# Patient Record
Sex: MALE | Race: BLACK OR AFRICAN AMERICAN | Employment: OTHER | URBAN - METROPOLITAN AREA
[De-identification: names, ages, dates, MRNs, and addresses within clinical notes are randomized per-mention and may not be internally consistent; named-entity substitution may affect disease eponyms.]

---

## 2019-03-13 ENCOUNTER — OFFICE VISIT (OUTPATIENT)
Dept: FAMILY MEDICINE | Facility: CLINIC | Age: 44
End: 2019-03-13

## 2019-03-13 ENCOUNTER — ANCILLARY PROCEDURE (OUTPATIENT)
Dept: GENERAL RADIOLOGY | Facility: CLINIC | Age: 44
End: 2019-03-13
Attending: FAMILY MEDICINE

## 2019-03-13 VITALS
DIASTOLIC BLOOD PRESSURE: 91 MMHG | HEART RATE: 78 BPM | HEIGHT: 67 IN | RESPIRATION RATE: 18 BRPM | OXYGEN SATURATION: 100 % | SYSTOLIC BLOOD PRESSURE: 138 MMHG | WEIGHT: 159.6 LBS | BODY MASS INDEX: 25.05 KG/M2 | TEMPERATURE: 98.1 F

## 2019-03-13 DIAGNOSIS — F10.11 HISTORY OF ALCOHOL ABUSE: ICD-10-CM

## 2019-03-13 DIAGNOSIS — R07.89 MID STERNAL CHEST PAIN: Primary | ICD-10-CM

## 2019-03-13 SDOH — HEALTH STABILITY: MENTAL HEALTH: HOW OFTEN DO YOU HAVE A DRINK CONTAINING ALCOHOL?: NEVER

## 2019-03-13 ASSESSMENT — MIFFLIN-ST. JEOR: SCORE: 1587.57

## 2019-03-13 NOTE — PROGRESS NOTES
Male Physical Note          HPI         Concerns today: Mid sternal pain.  Has been a problem for 20 years.  Patient believes it is due to a history of heavy drinking.  Is located in the middle of the chest, described as a sore feeling.  No left-sided chest pain or trouble breathing.  Denies a history of trauma.  He does not take any medications to help with pain.  He says the pain is worse in the cold or after working long hours. There is no known family history of heart disease.  Denies smoking.  Works as a  and at [employer].  His main reason for today's visit is to get a chest x-ray to make sure his lungs and bones are okay.    Patient Active Problem List   Diagnosis     Mid sternal chest pain     History of alcohol abuse       Past Medical History:   Diagnosis Date     Alcoholism (H)        Previous Medical Care   Says he has never seen a doctor before     Family History   Problem Relation Age of Onset     Cancer No family hx of      Asthma No family hx of      Arthritis No family hx of      Depression No family hx of             Review of Systems:     Review of Systems:  CONSTITUTIONAL: NEGATIVE for fever, chills, change in weight  INTEGUMENTARY/SKIN: NEGATIVE for worrisome rashes, moles or lesions  EYES: NEGATIVE for vision changes or irritation  ENT/MOUTH: NEGATIVE for ear, mouth and throat problems  RESP: NEGATIVE for significant cough or SOB  BREAST: NEGATIVE for masses, tenderness or discharge  CV: NEGATIVE for chest pain, palpitations or peripheral edema  GI: NEGATIVE for nausea, abdominal pain, heartburn, or change in bowel habits  : NEGATIVE for frequency, dysuria, or hematuria  MUSCULOSKELETAL: NEGATIVE for significant arthralgias or myalgia  NEURO: NEGATIVE for weakness, dizziness or paresthesias  ENDOCRINE: NEGATIVE for temperature intolerance, skin/hair changes  HEME/ALLERGY: NEGATIVE for bleeding problems  PSYCHIATRIC: NEGATIVE for changes in mood or affect  Sleep:    Do you snore most or the night (as reported by a family member)? No  Do you feel sleepy or extremely tired during most of the day? No           Social History     Social History     Socioeconomic History     Marital status:      Spouse name: Not on file     Number of children: Not on file     Years of education: Not on file     Highest education level: Not on file   Occupational History     Not on file   Social Needs     Financial resource strain: Not on file     Food insecurity:     Worry: Not on file     Inability: Not on file     Transportation needs:     Medical: Not on file     Non-medical: Not on file   Tobacco Use     Smoking status: Never Smoker     Smokeless tobacco: Never Used   Substance and Sexual Activity     Alcohol use: No     Frequency: Never     Drug use: No     Sexual activity: Not Currently   Lifestyle     Physical activity:     Days per week: Not on file     Minutes per session: Not on file     Stress: Not on file   Relationships     Social connections:     Talks on phone: Not on file     Gets together: Not on file     Attends Ten[employer] service: Not on file     Active member of club or organization: Not on file     Attends meetings of clubs or organizations: Not on file     Relationship status: Not on file     Intimate partner violence:     Fear of current or ex partner: Not on file     Emotionally abused: Not on file     Physically abused: Not on file     Forced sexual activity: Not on file   Other Topics Concern     Not on file   Social History Narrative     Not on file       Marital Status:    Has anyone hurt you physically, for example by pushing, hitting, slapping or kicking you or forcing you to have sex? Denies  Do you feel threatened or controlled by a partner, ex-partner or anyone in your life? Denies    Sexual Health     Did not discuss sexual health      Recommended Screening     HIV screening:  Recommended and patient declined testing.           Physical Exam:  "    Vitals: BP (!) 138/91   Pulse 78   Temp 98.1  F (36.7  C)   Resp 18   Ht 1.702 m (5' 7\")   Wt 72.4 kg (159 lb 9.6 oz)   SpO2 100%   BMI 25.00 kg/m    BMI= Body mass index is 25 kg/m .     GENERAL: healthy, alert and no distress  EYES: Eyes grossly normal to inspection  HENT: NC/AT  RESP: lungs clear to auscultation - no rales, no rhonchi, no wheezes  CV: regular rates and rhythm, normal S1 S2, no S3 or S4 and no murmur, no click or rub -  ABDOMEN: soft, no tenderness  NEURO: mentation- intact, speech- normal  PSYCH: Alert; speech- coherent , normal rate and volume; able to articulate logical thoughts, able to abstract reason, no tangential thoughts, no hallucinations or delusions, affect- normal    Assessment and Plan     Patient presented today to establish care and physical with a concern of midsternal chest pain that has been ongoing for 20 years.  He tells me he has not seen a doctor in the past.  Would not want any blood test done today because he does not want to know if there are any abnormal results as this would cause him stress.  The only thing he wants at today's visit is an x-ray of his chest.  He is not very interested in discussing health maintenance and preventative healthcare recommendations today.  It was difficult to explore his history completely today.     1. Mid sternal chest pain  A chest x-ray was obtained and on my initial evaluation of the images, did not see any significant pathology to explain patient's symptoms.  Will await read from radiologist.  And patient described his history of alcohol abuse, there was concern for reflux versus esophagitis.  This remains on the differential.  Low concern for cardiac etiology.  We will need to reevaluate at follow-up visit.  - XR CHEST 2 VW    2. History of alcohol abuse  Patient reports that many years ago he \"drank a lot\" but will not quantify the use of alcohol for me.  Says he does not drink more than 1 glass of wine once in a while " now.  History of alcohol abuse may be contributing to his presenting midsternal chest pain.  We will continue to evaluate.      Follow-up within 2-4 weeks.    Options for treatment and follow-up care were reviewed with the patient. Eliceo Lakew and/or guardian engaged in the decision making process and verbalized understanding of the options discussed and agreed with the final plan.    Veronica Maldonado MD  Family Medicine PGY3 Resident

## 2019-03-13 NOTE — PROGRESS NOTES
Preceptor Attestation:   Patient seen, evaluated and discussed with the resident. I have verified the content of the note, which accurately reflects my assessment of the patient and the plan of care.   Supervising Physician:  Rosales Morales MD

## 2019-08-26 ENCOUNTER — OFFICE VISIT (OUTPATIENT)
Dept: FAMILY MEDICINE | Facility: CLINIC | Age: 44
End: 2019-08-26
Payer: MEDICAID

## 2019-08-26 VITALS
DIASTOLIC BLOOD PRESSURE: 55 MMHG | BODY MASS INDEX: 25.39 KG/M2 | WEIGHT: 158 LBS | OXYGEN SATURATION: 99 % | HEART RATE: 74 BPM | HEIGHT: 66 IN | SYSTOLIC BLOOD PRESSURE: 124 MMHG | TEMPERATURE: 97.3 F

## 2019-08-26 DIAGNOSIS — M54.6 CHRONIC BILATERAL THORACIC BACK PAIN: Primary | ICD-10-CM

## 2019-08-26 DIAGNOSIS — G89.29 CHRONIC BILATERAL THORACIC BACK PAIN: Primary | ICD-10-CM

## 2019-08-26 DIAGNOSIS — M41.9 SCOLIOSIS, UNSPECIFIED SCOLIOSIS TYPE, UNSPECIFIED SPINAL REGION: ICD-10-CM

## 2019-08-26 ASSESSMENT — MIFFLIN-ST. JEOR: SCORE: 1566.68

## 2019-08-26 NOTE — PATIENT INSTRUCTIONS
Here is the plan from today's visit    1. Chronic back pain  Heat and ibuprofen   Physical therapy  If this does not help with pain will get XR in 1 month  - [facility], PT, HAND AND CHIROPRACTIC REFERRAL - [facility]; Future      Please call or return to clinic if your symptoms don't go away.    Follow up plan  1 month    Thank you for coming to [facility] today.  Lab Testing:  **If you had lab testing today and your results are reassuring or normal they will be mailed to you or sent through [employer] within 7 days.   **If the lab tests need quick action we will call you with the results.  The phone number we will call with results is # [phone] (home) . If this is not the best number please call our clinic and change the number.  Medication Refills:  If you need any refills please call your pharmacy and they will contact us.   If you need to  your refill at a new pharmacy, please contact the new pharmacy directly. The new pharmacy will help you get your medications transferred faster.   Scheduling:  If you have any concerns about today's visit or wish to schedule another appointment please call our office during normal business hours [phone] (8-5:00 M-F)  If a referral was made to a [facility] and you don't get a call from central scheduling please call [phone].  If a Mammogram was ordered for you at [facility] call [phone] to schedule or change your appointment.  If you had an XRay/CT/Ultrasound/MRI ordered the number is [phone] to schedule or change your radiology appointment.   Medical Concerns:  If you have urgent medical concerns please call [phone] at any time of the day.    Laura Frazier MD

## 2019-08-26 NOTE — PROGRESS NOTES
"       HPI       Eliceo Bird is a 43 year old  who presents for   Chief Complaint   Patient presents with     Pain     mid to lower back pain, ongoing for years, pain level 6       Back Pain      Duration: 15 years, sudden onset when fell playing sports. Has hurt ever since, has not seen a doctor for this. No recent worsening    Description:   Location of pain: low back bilateral, middle of back bilateral and upper back bilateral  Character of pain: dull ache  Pain radiation:none leans to the side  New numbness or weakness in legs, not attributed to pain:  No   Any new bowel or bladder incontinence?No     Intensity: Currently 6/10. Always has pain, but not severe. Nothing makes worse or better    History:   Pain interferes with job/home/school: No   History of back problems: no prior back problems  Any previous MRI or X-rays: None  Sees a specialist for back pain:  No  Therapies tried without relief: none    Alleviating factors:   Improved by: none tried      Precipitating factors:  Worsened by: Nothing      Accompanying Signs & Symptoms:  Risk of Fracture: No   Risk of Cauda Equina:No   Risk of Infection:  No   Risk of Cancer:  No     +++++++      Problem, Medication and Allergy Lists were reviewed and updated if needed..    Patient is an established patient of this clinic..         Review of Systems:   Review of Systems  6 system ROS negative other than as noted in HPI       Physical Exam:     Vitals:    08/26/19 1321   BP: 124/55   Pulse: 74   Temp: 97.3  F (36.3  C)   TempSrc: Oral   SpO2: 99%   Weight: 71.7 kg (158 lb)   Height: 1.68 m (5' 6.14\")     Body mass index is 25.39 kg/m .  Vitals were reviewed and were normal     Physical Exam   Constitutional: He is oriented to person, place, and time. He appears well-developed and well-nourished. No distress.   HENT:   Head: Normocephalic and atraumatic.   Eyes: EOM are normal.   Neck: Normal range of motion.   Pulmonary/Chest: Effort normal. No respiratory " distress.   Musculoskeletal: Normal range of motion.        Right shoulder: He exhibits normal range of motion.        Cervical back: Normal. He exhibits normal range of motion, no tenderness and no bony tenderness.        Thoracic back: Normal. He exhibits normal range of motion, no tenderness and no bony tenderness.        Lumbar back: Normal. He exhibits normal range of motion, no tenderness and no bony tenderness.   Unable to reproduce or exacerbate pain on exam. Good ROM with flexion, extension and twisting without exacerbation of pain.    Neurological: He is alert and oriented to person, place, and time. He displays normal reflexes. No sensory deficit. He exhibits normal muscle tone.   Skin: Skin is warm and dry.   Psychiatric: He has a normal mood and affect.   Nursing note and vitals reviewed.        Results:   No testing ordered today    Assessment and Plan        1. Chronic bilateral thoracic back pain  15 years of diffuse back pain, no exacerbating or alleviating factors. No recent worsening of red flag symptoms. Will trial ibuprofen, heat and PT. If does not improve can consider imaging. Recent CXR does show some scoliosis convex to the right  - [facility], PT, HAND AND CHIROPRACTIC REFERRAL - [facility]; Future       There are no discontinued medications.    Options for treatment and follow-up care were reviewed with the patient. Eliceo Bird  engaged in the decision making process and verbalized understanding of the options discussed and agreed with the final plan.    Laura Frazier MD

## 2019-08-26 NOTE — PROGRESS NOTES
Preceptor Attestation:   Patient seen, evaluated and discussed with the resident.   I have verified the content of the note, which accurately reflects my assessment of the patient and the plan of care.   Supervising Physician:  Hua Ly MD

## 2019-09-06 ENCOUNTER — THERAPY VISIT (OUTPATIENT)
Dept: PHYSICAL THERAPY | Facility: CLINIC | Age: 44
End: 2019-09-06
Payer: COMMERCIAL

## 2019-09-06 DIAGNOSIS — M54.6 CHRONIC BILATERAL THORACIC BACK PAIN: ICD-10-CM

## 2019-09-06 DIAGNOSIS — G89.29 CHRONIC BILATERAL THORACIC BACK PAIN: ICD-10-CM

## 2019-09-06 PROCEDURE — 97162 PT EVAL MOD COMPLEX 30 MIN: CPT | Mod: GP | Performed by: PHYSICAL THERAPIST

## 2019-09-06 PROCEDURE — 97110 THERAPEUTIC EXERCISES: CPT | Mod: GP | Performed by: PHYSICAL THERAPIST

## 2019-09-06 NOTE — PROGRESS NOTES
[facility] Initial Evaluation  Subjective:  The history is provided by the patient.   Type of problem:  Thoracic spine   Condition occurred with:  A fall/slip. This is a chronic condition   Problem details: Pt reports falling down in 1996 and having chronic pain since then. Unsure what makes it worse or better currently..   Patient reports pain:  Mid thoracic. Radiates to:  None. Associated with: none. Symptoms are exacerbated by carrying and lifting and relieved by nothing.    Where condition occurred: at home.  and reported as 4/10 on pain scale. General health as reported by patient is fair. Pertinent medical history includes:  None.    Surgeries include:  None.  Current medications:  None.     Pain is described as aching and is intermittent. Pain is worse during the day. Since onset symptoms are unchanged.      Patient is none reported.   Barriers include:  None as reported by patient.  Red flags:  None as reported by patient.                      Thoracic Spine EXAMINATION  CERVICAL SCREEN  AROM: WNL/symmetrical     THORACIC SPINE SCREEN  Restricted rotation bilateral and mild loss of extension  Spring Testing: Hypomobile mid thoracic spine    STATIC POSTURE  Forward head: mild     Rounded shoulders:mild    SHOULDER RANGE OF MOTION  AROM Flexion Abduction ER   Base Ext/IR   Left WNL WNL WNL WNL   Right WNL WNL WNL WNL     SHOULDER STRENGTH  MMT Flexion Abduction Base ER Belly Press   Left 5/5 5/5 5-/5 5/5   Right 5/5 5/5 5-/5 5/5     PALPATION  Left: No tenderness to palpation  Right: No tenderness to palpation    Assessment/Plan:  Patient is a 43 year old male with thoracic complaints.    Patient has the following significant findings with corresponding treatment plan.                Diagnosis 1:  Thoracic spine pain  Pain -  manual therapy, self management, education, directional preference exercise and home program  Decreased ROM/flexibility - manual therapy and therapeutic  exercise  Decreased joint mobility - manual therapy and therapeutic exercise  Decreased strength - therapeutic exercise and therapeutic activities  Decreased proprioception - neuro re-education and therapeutic activities  Impaired posture - neuro re-education    Therapy Evaluation Codes:   1) History comprised of:   Personal factors that impact the plan of care:      Language.    Comorbidity factors that impact the plan of care are:      None.     Medications impacting care: None.  2) Examination of Body Systems comprised of:   Body structures and functions that impact the plan of care:      Thoracic Spine.   Activity limitations that impact the plan of care are:      Bathing, Bending, Lifting, Working, Sleeping and Laying down.  3) Clinical presentation characteristics are:   Evolving/Changing.  4) Decision-Making    Moderate complexity using standardized patient assessment instrument and/or measureable assessment of functional outcome.  Cumulative Therapy Evaluation is: Moderate complexity.    Previous and current functional limitations:  (See Goal Flow Sheet for this information)    Short term and Long term goals: (See Goal Flow Sheet for this information)     Communication ability:  Patient appears to be able to clearly communicate and understand verbal and written communication and follow directions correctly.  Treatment Explanation - The following has been discussed with the patient:   RX ordered/plan of care  Anticipated outcomes  Possible risks and side effects  This patient would benefit from PT intervention to resume normal activities.   Rehab potential is fair.    Frequency:  2 X a month, once daily  Duration:  for 2 months  Discharge Plan:  Achieve all LTG.  Independent in home treatment program.  Reach maximal therapeutic benefit.    Please refer to the daily flowsheet for treatment today, total treatment time and time spent performing 1:1 timed codes.

## 2019-09-20 ENCOUNTER — THERAPY VISIT (OUTPATIENT)
Dept: PHYSICAL THERAPY | Facility: CLINIC | Age: 44
End: 2019-09-20
Payer: COMMERCIAL

## 2019-09-20 DIAGNOSIS — G89.29 CHRONIC BILATERAL THORACIC BACK PAIN: Primary | ICD-10-CM

## 2019-09-20 DIAGNOSIS — M54.6 CHRONIC BILATERAL THORACIC BACK PAIN: Primary | ICD-10-CM

## 2019-09-20 PROCEDURE — 97112 NEUROMUSCULAR REEDUCATION: CPT | Mod: GP | Performed by: PHYSICAL THERAPIST

## 2019-09-20 PROCEDURE — 97110 THERAPEUTIC EXERCISES: CPT | Mod: GP | Performed by: PHYSICAL THERAPIST

## 2019-11-06 PROBLEM — M54.6 CHRONIC BILATERAL THORACIC BACK PAIN: Status: RESOLVED | Noted: 2019-08-26 | Resolved: 2019-11-06

## 2019-11-06 PROBLEM — G89.29 CHRONIC BILATERAL THORACIC BACK PAIN: Status: RESOLVED | Noted: 2019-08-26 | Resolved: 2019-11-06

## 2019-11-06 NOTE — PROGRESS NOTES
Discharge Note    Progress reporting period is from initial evaluation date (please see noted date below) to Sep 20, 2019.  Linked Episodes   Type: Episode: Status: Noted: Resolved: Last update: Updated by:   PHYSICAL THERAPY T Spine Active 9/6/2019 9/20/2019 10:49 AM Pedrito Ricketts PT      Comments:       Eliceo failed to follow up and current status is unknown.  Please see information below for last relevant information on current status.  Patient seen for 2 visits.    SUBJECTIVE  Subjective changes noted by patient:  Eliceo reports feeling a little bit better.  .  Current pain level is 3/10.     Previous pain level was  4/10.   Changes in function:  Yes (See Goal flowsheet attached for changes in current functional level)  Adverse reaction to treatment or activity: None    OBJECTIVE  Changes noted in objective findings: Tolerates exercises well today. Needs verbal/manual cues to decrease shrug with arm lifts overhead     ASSESSMENT/PLAN  Diagnosis: Thoracic spine pain   Updated problem list and treatment plan:   Pain - HEP  Decreased strength - HEP  STG/LTGs have been met or progress has been made towards goals:  Yes, please see goal flowsheet for most current information  Assessment of Progress: current status is unknown.    Last current status:     Self Management Plans:  HEP  I have re-evaluated this patient and find that the nature, scope, duration and intensity of the therapy is appropriate for the medical condition of the patient.  Eliceo continues to require the following intervention to meet STG and LTG's:  HEP.    Recommendations:  Discharge with current home program.  Patient to follow up with MD as needed.    Please refer to the daily flowsheet for treatment today, total treatment time and time spent performing 1:1 timed codes.

## 2025-01-27 ENCOUNTER — OFFICE VISIT (OUTPATIENT)
Dept: FAMILY MEDICINE | Facility: CLINIC | Age: 50
End: 2025-01-27
Payer: COMMERCIAL

## 2025-01-27 VITALS
DIASTOLIC BLOOD PRESSURE: 84 MMHG | WEIGHT: 171 LBS | SYSTOLIC BLOOD PRESSURE: 137 MMHG | HEIGHT: 66 IN | HEART RATE: 72 BPM | OXYGEN SATURATION: 98 % | RESPIRATION RATE: 14 BRPM | TEMPERATURE: 98.1 F | BODY MASS INDEX: 27.48 KG/M2

## 2025-01-27 DIAGNOSIS — N40.1 BENIGN PROSTATIC HYPERPLASIA WITH URINARY HESITANCY: Primary | ICD-10-CM

## 2025-01-27 DIAGNOSIS — Z00.00 PREVENTATIVE HEALTH CARE: ICD-10-CM

## 2025-01-27 DIAGNOSIS — Z71.85 VACCINE COUNSELING: ICD-10-CM

## 2025-01-27 DIAGNOSIS — R39.11 BENIGN PROSTATIC HYPERPLASIA WITH URINARY HESITANCY: Primary | ICD-10-CM

## 2025-01-27 LAB
ALBUMIN UR-MCNC: NEGATIVE MG/DL
APPEARANCE UR: CLEAR
BILIRUB UR QL STRIP: NEGATIVE
COLOR UR AUTO: YELLOW
GLUCOSE UR STRIP-MCNC: NEGATIVE MG/DL
HGB UR QL STRIP: NEGATIVE
KETONES UR STRIP-MCNC: NEGATIVE MG/DL
LEUKOCYTE ESTERASE UR QL STRIP: NEGATIVE
NITRATE UR QL: NEGATIVE
PH UR STRIP: 5.5 [PH] (ref 5–8)
PSA SERPL DL<=0.01 NG/ML-MCNC: 0.75 NG/ML (ref 0–2.5)
SP GR UR STRIP: >=1.03 (ref 1–1.03)
UROBILINOGEN UR STRIP-ACNC: 0.2 E.U./DL

## 2025-01-27 RX ORDER — TAMSULOSIN HYDROCHLORIDE 0.4 MG/1
0.4 CAPSULE ORAL DAILY
Qty: 90 CAPSULE | Refills: 2 | Status: SHIPPED | OUTPATIENT
Start: 2025-01-27

## 2025-01-27 NOTE — PROGRESS NOTES
"  Assessment & Plan     Benign prostatic hyperplasia with urinary hesitancy  3-4 years of LUTS sxs with globally enlarged prostate on exam c/w BPH. Rule out infection with UA due to some reports hx of R flank pain (although not present today, no CVA tenderness, VSS table). Will obtain baseline PSA. Discussed flomax will take several weeks to start seeing an effect, counseled on side effects.  - UA Macroscopic with reflex to Microscopic and Culture; Future  - Prostate spec antigen screen; Future  - tamsulosin (FLOMAX) 0.4 MG capsule; Take 1 capsule (0.4 mg) by mouth daily.    Vaccine counseling  Declines covid, flu vaccine today.    Preventative health care  Declines routine HIV, hep C, lipid testing today.    Return if symptoms worsen or fail to improve.    Isaías Fenton is a 49 year old, presenting for the following health issues:  New Patient (Re-establishing care with [facility] ), Prostate Problem (Urinary hesitancy; possible prostate problem ), and Abdominal Pain (Right flank pain )      1/27/2025     9:09 AM   Additional Questions   Roomed by Hau         1/27/2025    Information    services provided? No     HPI     Having urinary problems--hesitancy with stream, weak stream/dribbling, increased frequency. Has worsened over the past 3-5 years. Increased frequency.    No fevers, no new back/bony pain, no new weight gain or loss    Drinks 2 cups coffee, limited soda, 1 big cup of tea (andressa)      Objective    /84   Pulse 72   Temp 98.1  F (36.7  C) (Temporal)   Resp 14   Ht 1.68 m (5' 6.14\")   Wt 77.6 kg (171 lb)   SpO2 98%   BMI 27.48 kg/m    Body mass index is 27.48 kg/m .  Physical Exam  Constitutional:       General: He is not in acute distress.     Appearance: Normal appearance.   HENT:      Head: Normocephalic and atraumatic.      Mouth/Throat:      Dentition: Dental caries present.      Pharynx: Oropharynx is clear.   Eyes:      Extraocular Movements: " Extraocular movements intact.      Pupils: Pupils are equal, round, and reactive to light.   Cardiovascular:      Rate and Rhythm: Normal rate and regular rhythm.      Pulses: Normal pulses.      Heart sounds: Murmur heard.      Systolic murmur is present with a grade of 2/6.      No friction rub. No gallop.   Pulmonary:      Effort: Pulmonary effort is normal. No respiratory distress.      Breath sounds: Normal breath sounds. No stridor. No wheezing, rhonchi or rales.   Abdominal:      General: Abdomen is flat. There is no distension.      Palpations: Abdomen is soft.      Tenderness: There is no abdominal tenderness.   Genitourinary:     Prostate: Enlarged.   Musculoskeletal:         General: No swelling. Normal range of motion.      Cervical back: Normal range of motion. No tenderness.   Lymphadenopathy:      Cervical: No cervical adenopathy.   Skin:     General: Skin is warm and dry.   Neurological:      General: No focal deficit present.      Mental Status: He is alert and oriented to person, place, and time. Mental status is at baseline.          Signed Electronically by: Alka Hassan MD

## 2025-01-27 NOTE — PATIENT INSTRUCTIONS
Patient Education   Here is the plan from today's visit    1. Benign prostatic hyperplasia with urinary hesitancy (Primary)  - UA Macroscopic with reflex to Microscopic and Culture; Future  - Prostate spec antigen screen; Future  - tamsulosin (FLOMAX) 0.4 MG capsule; Take 1 capsule (0.4 mg) by mouth daily.  Dispense: 90 capsule; Refill: 2      Please call or return to clinic if your symptoms don't go away.    Follow up plan  Return if symptoms worsen or fail to improve.    Thank you for coming to [facility] today.  Lab Testing:  **If you had lab testing today and your results are reassuring or normal they will be mailed to you or sent through [employer] within 7 days.   **If the lab tests need quick action we will call you with the results.  **If you are having labs done on a different day, please call [phone] to schedule at [facility] or [phone] for other [facility]patient Lab locations. Labs do not offer walk-in appointments.  The phone number we will call with results is # [phone] (home) . If this is not the best number please call our clinic and change the number.  Medication Refills:  If you need any refills please call your pharmacy and they will contact us.   If you need to  your refill at a new pharmacy, please contact the new pharmacy directly. The new pharmacy will help you get your medications transferred faster.   Scheduling:  If you have any concerns about today's visit or wish to schedule another appointment please call our office during normal business hours [phone] (8-5:00 M-F). If you can no longer make a scheduled visit, please cancel via [employer] or call us to cancel.   If a referral was made to an [facility] specialty provider and you do not get a call from central scheduling, please refer to directions on your visit summary or call our office during normal business hours for assistance.   If a Mammogram was ordered for you at [facility] call  [phone] to schedule or change your appointment.  If you had an XRay/CT/Ultrasound/MRI ordered the number is [phone] to schedule or change your radiology appointment.   [facility] has limited ultrasound appointments available on Wednesdays, if you would like your ultrasound at [facility], please call [phone] to schedule.   Medical Concerns:  If you have urgent medical concerns please call [phone] at any time of the day.    Alka Hassan MD

## 2025-01-27 NOTE — LETTER
January 28, 2025      Eliceo Bird  [address]  [facility] [address]        Dear ,    We are writing to inform you of your test results.    Your test results fall within the expected range(s) or remain unchanged from previous results.  Please continue with current treatment plan.    Resulted Orders   UA Macroscopic with reflex to Microscopic and Culture   Result Value Ref Range    Color Urine Yellow Colorless, Straw, Light Yellow, Yellow    Appearance Urine Clear Clear    Glucose Urine Negative Negative mg/dL    Bilirubin Urine Negative Negative    Ketones Urine Negative Negative mg/dL    Specific Gravity Urine >=1.030 1.005 - 1.030    Blood Urine Negative Negative    pH Urine 5.5 5.0 - 8.0    Protein Albumin Urine Negative Negative mg/dL    Urobilinogen Urine 0.2 0.2, 1.0 E.U./dL    Nitrite Urine Negative Negative    Leukocyte Esterase Urine Negative Negative    Narrative    Microscopic not indicated   Prostate spec antigen screen   Result Value Ref Range    Prostate Specific Antigen Screen 0.75 0.00 - 2.50 ng/mL    Narrative    This result is obtained using the Roche Elecsys total PSA method on the kristie [id number] immunoassay analyzer, which is an ultrasensitive method. Results obtained with different assay methods or kits cannot be used interchangeably.  This test is intended for initial prostate cancer screening. PSA values exceeding the age-specific limits are suspicious for prostate disease, but additional testing, such as prostate biopsy, is needed to diagnose prostate pathology. The [employer] Society recommends annual examination with digital rectal examination and serum PSA beginning at age 50 and for men with a life expectancy of at least 10 years after detection of prostate cancer. For men in high-risk groups, such as  Americans or men with a first-degree relative diagnosed at a younger age, testing should begin at a younger age. It is generally recommended that  information be provided to patients about the benefits and limitations of testing and treatment so they can make informed decisions.       If you have any questions or concerns, please call the clinic at the number listed above.       Sincerely,      Alka Hassan MD    Electronically signed

## 2025-08-08 PROBLEM — F10.21 ALCOHOL DEPENDENCE IN REMISSION (H): Status: ACTIVE | Noted: 2025-08-08

## 2025-08-08 PROBLEM — Z87.828 HISTORY OF MOTOR VEHICLE ACCIDENT: Status: ACTIVE | Noted: 2025-08-08

## 2025-08-11 ENCOUNTER — RESULTS FOLLOW-UP (OUTPATIENT)
Dept: FAMILY MEDICINE | Facility: CLINIC | Age: 50
End: 2025-08-11
Payer: COMMERCIAL

## 2025-08-11 PROBLEM — Z78.9 HEPATITIS B IMMUNE: Status: ACTIVE | Noted: 2025-08-11

## 2025-08-15 ENCOUNTER — ANCILLARY PROCEDURE (OUTPATIENT)
Dept: GENERAL RADIOLOGY | Facility: CLINIC | Age: 50
End: 2025-08-15
Attending: FAMILY MEDICINE
Payer: COMMERCIAL

## 2025-08-15 DIAGNOSIS — Z11.7 ENCOUNTER FOR TESTING FOR LATENT TUBERCULOSIS: ICD-10-CM

## 2025-08-15 PROBLEM — Z22.7 LATENT TUBERCULOSIS BY BLOOD TEST: Status: ACTIVE | Noted: 2025-08-15

## 2025-08-15 PROCEDURE — 71046 X-RAY EXAM CHEST 2 VIEWS: CPT | Mod: FY | Performed by: RADIOLOGY
